# Patient Record
(demographics unavailable — no encounter records)

---

## 2025-01-12 NOTE — ASSESSMENT
[FreeTextEntry1] : several months of atraumatic back pain that started after weightlifting now present in regular day to day. Exam and XR consistent with possible stress reaction, spondylolisthesis. One reported radicular event that self resolved. - MRI lumbar spine - Out of physical activity  - PT referral - Motrin, tylenol as needed - Follow up after MRI

## 2025-01-12 NOTE — REVIEW OF SYSTEMS
[Arthralgia] : arthralgia [Joint Pain] : joint pain [Joint Stiffness] : no joint stiffness [Joint Swelling] : no joint swelling [Negative] : Integumentary

## 2025-01-12 NOTE — PHYSICAL EXAM
[de-identified] : Constitutional: Well developed, well nourished, able to communicate Neuro: Normal sensation, No focal deficits Skin: Intact CV: Peripheral vascular exam grossly normal Pulm: No signs of respiratory distress Psych: Oriented, normal mood and affect  Back: - No obvious deformity - No pain with palpation of spinous processes  - Pain to gluteal musculature on the R - Mild pain to palpation of bilateral lower lumbar paraspinals - No pain with SI palpation - ROM intact throughout flexion, extension, sidebending, and rotation. Pain with extension - 5/5 strength throughout LE evaluation bilaterally - No pain with MEGAN - Negative straight leg raise bilaterally. Tight hamstrings bilaterally - Positive Stork test - Positive Sphinx test - Asymmetric forward standing flexion test - Distally neurovascularly intact [de-identified] : 6 views lumbar spine showing possible grade 1 spondylolisthesis on L4 L5 with difficult to see pars bilaterally on obliques. No fracture or dislocation

## 2025-01-12 NOTE — HISTORY OF PRESENT ILLNESS
[de-identified] : 01/12/2025: Patient is a 16yo male presenting with Mom for evaluation of lower back pain that has been present on and off over the last 8 or so months without injury. He noticed pain initially attributed to increased physical activity, weight lifting. He rested with improvements until recently pain has now been present without physical activity. He says he had one episode of pain radiating down his RLE which resolved on it's own. He denies any weakness or ongoing paresthesias.

## 2025-02-22 NOTE — HISTORY OF PRESENT ILLNESS
[de-identified] : 02/21/2025: Presenting for follow up of lower back pain. States he is still having pain with physical activities, but not during the day. He has been taking tylenol regularly. He just started PT, 1 session so far. No new injuries.   01/12/2025: Patient is a 16yo male presenting with Mom for evaluation of lower back pain that has been present on and off over the last 8 or so months without injury. He noticed pain initially attributed to increased physical activity, weight lifting. He rested with improvements until recently pain has now been present without physical activity. He says he had one episode of pain radiating down his RLE which resolved on it's own. He denies any weakness or ongoing paresthesias.

## 2025-02-22 NOTE — HISTORY OF PRESENT ILLNESS
[de-identified] : 02/21/2025: Presenting for follow up of lower back pain. States he is still having pain with physical activities, but not during the day. He has been taking tylenol regularly. He just started PT, 1 session so far. No new injuries.   01/12/2025: Patient is a 14yo male presenting with Mom for evaluation of lower back pain that has been present on and off over the last 8 or so months without injury. He noticed pain initially attributed to increased physical activity, weight lifting. He rested with improvements until recently pain has now been present without physical activity. He says he had one episode of pain radiating down his RLE which resolved on it's own. He denies any weakness or ongoing paresthesias.  complains of pain/discomfort

## 2025-02-22 NOTE — REASON FOR VISIT
[Initial Visit] : an initial visit for [FreeTextEntry2] : back pain [TextEntry] : patient is here for a follow up on back pain and mri

## 2025-02-22 NOTE — PHYSICAL EXAM
[de-identified] : Constitutional: Well developed, well nourished, able to communicate Neuro: Normal sensation, No focal deficits Skin: Intact CV: Peripheral vascular exam grossly normal Pulm: No signs of respiratory distress Psych: Oriented, normal mood and affect  Back: - No obvious deformity - No pain with palpation of spinous processes  - Pain to gluteal musculature on the R - Mild pain to palpation of bilateral lower lumbar paraspinals - No pain with SI palpation - ROM intact throughout flexion, extension, sidebending, and rotation. Pain with extension - 5/5 strength throughout LE evaluation bilaterally - No pain with MEGAN - Negative straight leg raise bilaterally. Tight hamstrings bilaterally - Positive Stork test - Positive Sphinx test - Asymmetric forward standing flexion test - Distally neurovascularly intact [de-identified] : 6 views lumbar spine showing possible grade 1 spondylolisthesis on L4 L5 with difficult to see pars bilaterally on obliques. No fracture or dislocation

## 2025-02-22 NOTE — PHYSICAL EXAM
[de-identified] : Constitutional: Well developed, well nourished, able to communicate Neuro: Normal sensation, No focal deficits Skin: Intact CV: Peripheral vascular exam grossly normal Pulm: No signs of respiratory distress Psych: Oriented, normal mood and affect  Back: - No obvious deformity - No pain with palpation of spinous processes  - Pain to gluteal musculature on the R - Mild pain to palpation of bilateral lower lumbar paraspinals - No pain with SI palpation - ROM intact throughout flexion, extension, sidebending, and rotation. Pain with extension - 5/5 strength throughout LE evaluation bilaterally - No pain with MEGAN - Negative straight leg raise bilaterally. Tight hamstrings bilaterally - Positive Stork test - Positive Sphinx test - Asymmetric forward standing flexion test - Distally neurovascularly intact [de-identified] : 6 views lumbar spine showing possible grade 1 spondylolisthesis on L4 L5 with difficult to see pars bilaterally on obliques. No fracture or dislocation

## 2025-02-22 NOTE — REVIEW OF SYSTEMS
[Arthralgia] : arthralgia [Joint Pain] : joint pain [Negative] : Integumentary [Joint Stiffness] : no joint stiffness [Joint Swelling] : no joint swelling

## 2025-02-22 NOTE — ASSESSMENT
[FreeTextEntry1] : several months of atraumatic back pain that started after weightlifting now present in regular day to day. Exam and XR consistent with possible stress reaction, spondylolisthesis. One reported radicular event that self resolved. MRI with anterolisthesis L5-S1 with possible pars defect. Labs obtained as well. Initial elevated calcium corrected on 2nd set and normal PTH.  - Rest from physical activity - Continue working with PT - Tylenol, nsaid prn - Follow up in 4 weeks

## 2025-04-04 NOTE — HISTORY OF PRESENT ILLNESS
[de-identified] : 04/04/2025: Presenting for follow up of lower back pain. Since last visit, pain is much improved. Only feeling pain after waking up in the morning which resolves when getting out of bed. He continues to do PT. He is taking Iron and vitamin D. Has remained out of physical activities. No new injuries.    02/21/2025: Presenting for follow up of lower back pain. States he is still having pain with physical activities, but not during the day. He has been taking tylenol regularly. He just started PT, 1 session so far. No new injuries.   01/12/2025: Patient is a 16yo male presenting with Mom for evaluation of lower back pain that has been present on and off over the last 8 or so months without injury. He noticed pain initially attributed to increased physical activity, weight lifting. He rested with improvements until recently pain has now been present without physical activity. He says he had one episode of pain radiating down his RLE which resolved on it's own. He denies any weakness or ongoing paresthesias.

## 2025-04-04 NOTE — PHYSICAL EXAM
[de-identified] : Constitutional: Well developed, well nourished, able to communicate Neuro: Normal sensation, No focal deficits Skin: Intact CV: Peripheral vascular exam grossly normal Pulm: No signs of respiratory distress Psych: Oriented, normal mood and affect  Back: - No obvious deformity - No pain with palpation of spinous processes  -  no Pain to gluteal musculature on the R - no  pain to palpation of bilateral lower lumbar paraspinals - No pain with SI palpation - ROM intact throughout flexion, extension, sidebending, and rotation. Mild Pain with extension - 5/5 strength throughout LE evaluation bilaterally - No pain with MEGAN - Negative straight leg raise bilaterally. L hamstring slightly tight - Neg Stork test - Asymmetric forward standing flexion test - Distally neurovascularly intact [de-identified] : 6 views lumbar spine showing possible grade 1 spondylolisthesis on L4 L5 with difficult to see pars bilaterally on obliques. No fracture or dislocation

## 2025-04-04 NOTE — REASON FOR VISIT
[Follow-Up Visit] : a follow-up visit for [FreeTextEntry2] : back pain [TextEntry] : patient is here for a follow up on back pain and mri

## 2025-04-04 NOTE — ASSESSMENT
[FreeTextEntry1] : several months of atraumatic back pain that started after weightlifting turned into regular day to day. Initial Exam and XR consistent with possible stress reaction, spondylolisthesis. One reported radicular event that self resolved. MRI with anterolisthesis L5-S1 with possible pars defect. Labs obtained as well. Initial elevated calcium corrected on 2nd set and normal PTH. Largely resolved so far with PT - Continue PT - Continue vit D and Iron - Tylenol, nsaid prn - Follow up in 4-6 weeks

## 2025-05-16 NOTE — HISTORY OF PRESENT ILLNESS
[de-identified] : 05/16/2025: Presenting for follow up of lower back pain. Since last visit, he was improving, but recently played basketball and felt sore afterwards. Still in PT. Intermittently taking vit D and Fe.    04/04/2025: Presenting for follow up of lower back pain. Since last visit, pain is much improved. Only feeling pain after waking up in the morning which resolves when getting out of bed. He continues to do PT. He is taking Iron and vitamin D. Has remained out of physical activities. No new injuries.    02/21/2025: Presenting for follow up of lower back pain. States he is still having pain with physical activities, but not during the day. He has been taking tylenol regularly. He just started PT, 1 session so far. No new injuries.   01/12/2025: Patient is a 16yo male presenting with Mom for evaluation of lower back pain that has been present on and off over the last 8 or so months without injury. He noticed pain initially attributed to increased physical activity, weight lifting. He rested with improvements until recently pain has now been present without physical activity. He says he had one episode of pain radiating down his RLE which resolved on it's own. He denies any weakness or ongoing paresthesias.

## 2025-05-16 NOTE — ASSESSMENT
[FreeTextEntry1] : several months of atraumatic back pain that started after weightlifting turned into regular day to day. Initial Exam and XR consistent with possible stress reaction, spondylolisthesis. One reported radicular event that self resolved. MRI with anterolisthesis L5-S1 with possible pars defect. Labs obtained as well. Initial elevated calcium corrected on 2nd set and normal PTH. Largely resolved so far with PT with some exacerbation with athletic activities - Continue PT - Exercise modifications discussed - Continue vit D and Iron - Tylenol, nsaid prn - Follow up in 2 months

## 2025-05-16 NOTE — PHYSICAL EXAM
[de-identified] : Constitutional: Well developed, well nourished, able to communicate Neuro: Normal sensation, No focal deficits Skin: Intact CV: Peripheral vascular exam grossly normal Pulm: No signs of respiratory distress Psych: Oriented, normal mood and affect  Back: - No obvious deformity - No pain with palpation of spinous processes  -  no Pain to gluteal musculature on the R - no  pain to palpation of bilateral lower lumbar paraspinals - No pain with SI palpation - ROM intact throughout flexion, extension, sidebending, and rotation. Mild Pain with extension - 5/5 strength throughout LE evaluation bilaterally - No pain with MEGAN - Negative straight leg raise bilaterally. Improving hamstring tightness. able to be brought to 90 Degrees hip flexion, straight leg - Neg Stork test - Asymmetric forward standing flexion test, 0.5in - Distally neurovascularly intact [de-identified] : 6 views lumbar spine showing possible grade 1 spondylolisthesis on L4 L5 with difficult to see pars bilaterally on obliques. No fracture or dislocation